# Patient Record
Sex: FEMALE | Race: WHITE | NOT HISPANIC OR LATINO | Employment: FULL TIME | URBAN - METROPOLITAN AREA
[De-identification: names, ages, dates, MRNs, and addresses within clinical notes are randomized per-mention and may not be internally consistent; named-entity substitution may affect disease eponyms.]

---

## 2022-07-08 ENCOUNTER — HOSPITAL ENCOUNTER (INPATIENT)
Facility: CLINIC | Age: 62
LOS: 1 days | Discharge: HOME | DRG: 153 | End: 2022-07-09
Attending: EMERGENCY MEDICINE | Admitting: HOSPITALIST
Payer: COMMERCIAL

## 2022-07-08 DIAGNOSIS — J36 TONSILLAR ABSCESS: ICD-10-CM

## 2022-07-08 PROBLEM — J39.0 RETROPHARYNGEAL ABSCESS: Status: ACTIVE | Noted: 2022-07-08

## 2022-07-08 PROCEDURE — 258N000003 HC RX IP 258 OP 636: Performed by: EMERGENCY MEDICINE

## 2022-07-08 PROCEDURE — 36415 COLL VENOUS BLD VENIPUNCTURE: CPT | Performed by: EMERGENCY MEDICINE

## 2022-07-08 PROCEDURE — 99285 EMERGENCY DEPT VISIT HI MDM: CPT | Mod: 25

## 2022-07-08 PROCEDURE — 250N000011 HC RX IP 250 OP 636: Performed by: EMERGENCY MEDICINE

## 2022-07-08 PROCEDURE — 96366 THER/PROPH/DIAG IV INF ADDON: CPT

## 2022-07-08 PROCEDURE — 120N000001 HC R&B MED SURG/OB

## 2022-07-08 PROCEDURE — 96365 THER/PROPH/DIAG IV INF INIT: CPT

## 2022-07-08 PROCEDURE — 87040 BLOOD CULTURE FOR BACTERIA: CPT | Performed by: EMERGENCY MEDICINE

## 2022-07-08 RX ORDER — SODIUM CHLORIDE 9 MG/ML
INJECTION, SOLUTION INTRAVENOUS CONTINUOUS
Status: DISCONTINUED | OUTPATIENT
Start: 2022-07-08 | End: 2022-07-09 | Stop reason: HOSPADM

## 2022-07-08 RX ORDER — AMPICILLIN AND SULBACTAM 2; 1 G/1; G/1
3 INJECTION, POWDER, FOR SOLUTION INTRAMUSCULAR; INTRAVENOUS ONCE
Status: COMPLETED | OUTPATIENT
Start: 2022-07-08 | End: 2022-07-09

## 2022-07-08 RX ADMIN — SODIUM CHLORIDE 1000 ML: 9 INJECTION, SOLUTION INTRAVENOUS at 22:03

## 2022-07-08 RX ADMIN — AMPICILLIN SODIUM AND SULBACTAM SODIUM 3 G: 2; 1 INJECTION, POWDER, FOR SOLUTION INTRAMUSCULAR; INTRAVENOUS at 22:27

## 2022-07-08 ASSESSMENT — ENCOUNTER SYMPTOMS
ABDOMINAL PAIN: 0
FEVER: 1
VOMITING: 0
SORE THROAT: 1

## 2022-07-08 ASSESSMENT — ACTIVITIES OF DAILY LIVING (ADL): ADLS_ACUITY_SCORE: 35

## 2022-07-09 VITALS
HEART RATE: 80 BPM | HEIGHT: 68 IN | DIASTOLIC BLOOD PRESSURE: 69 MMHG | SYSTOLIC BLOOD PRESSURE: 106 MMHG | BODY MASS INDEX: 22.43 KG/M2 | TEMPERATURE: 98.2 F | RESPIRATION RATE: 18 BRPM | WEIGHT: 148 LBS | OXYGEN SATURATION: 97 %

## 2022-07-09 LAB
ANION GAP SERPL CALCULATED.3IONS-SCNC: 5 MMOL/L (ref 3–14)
BASOPHILS # BLD AUTO: 0 10E3/UL (ref 0–0.2)
BASOPHILS NFR BLD AUTO: 0 %
BUN SERPL-MCNC: 19 MG/DL (ref 7–30)
CALCIUM SERPL-MCNC: 8.3 MG/DL (ref 8.5–10.1)
CHLORIDE BLD-SCNC: 109 MMOL/L (ref 94–109)
CO2 SERPL-SCNC: 26 MMOL/L (ref 20–32)
CREAT SERPL-MCNC: 0.45 MG/DL (ref 0.52–1.04)
EOSINOPHIL # BLD AUTO: 0 10E3/UL (ref 0–0.7)
EOSINOPHIL NFR BLD AUTO: 0 %
ERYTHROCYTE [DISTWIDTH] IN BLOOD BY AUTOMATED COUNT: 13.1 % (ref 10–15)
GFR SERPL CREATININE-BSD FRML MDRD: >90 ML/MIN/1.73M2
GLUCOSE BLD-MCNC: 142 MG/DL (ref 70–99)
HCT VFR BLD AUTO: 36 % (ref 35–47)
HGB BLD-MCNC: 11.4 G/DL (ref 11.7–15.7)
IMM GRANULOCYTES # BLD: 0 10E3/UL
IMM GRANULOCYTES NFR BLD: 0 %
LYMPHOCYTES # BLD AUTO: 1.2 10E3/UL (ref 0.8–5.3)
LYMPHOCYTES NFR BLD AUTO: 16 %
MCH RBC QN AUTO: 29.2 PG (ref 26.5–33)
MCHC RBC AUTO-ENTMCNC: 31.7 G/DL (ref 31.5–36.5)
MCV RBC AUTO: 92 FL (ref 78–100)
MONOCYTES # BLD AUTO: 0.2 10E3/UL (ref 0–1.3)
MONOCYTES NFR BLD AUTO: 3 %
NEUTROPHILS # BLD AUTO: 6.1 10E3/UL (ref 1.6–8.3)
NEUTROPHILS NFR BLD AUTO: 81 %
NRBC # BLD AUTO: 0 10E3/UL
NRBC BLD AUTO-RTO: 0 /100
PLATELET # BLD AUTO: 299 10E3/UL (ref 150–450)
POTASSIUM BLD-SCNC: 3.9 MMOL/L (ref 3.4–5.3)
RBC # BLD AUTO: 3.91 10E6/UL (ref 3.8–5.2)
SODIUM SERPL-SCNC: 140 MMOL/L (ref 133–144)
WBC # BLD AUTO: 7.6 10E3/UL (ref 4–11)

## 2022-07-09 PROCEDURE — 250N000011 HC RX IP 250 OP 636: Performed by: HOSPITALIST

## 2022-07-09 PROCEDURE — 99236 HOSP IP/OBS SAME DATE HI 85: CPT | Performed by: HOSPITALIST

## 2022-07-09 PROCEDURE — 99207 PR APP CREDIT; MD BILLING SHARED VISIT: CPT | Performed by: HOSPITALIST

## 2022-07-09 PROCEDURE — 85025 COMPLETE CBC W/AUTO DIFF WBC: CPT | Performed by: HOSPITALIST

## 2022-07-09 PROCEDURE — 258N000003 HC RX IP 258 OP 636: Performed by: HOSPITALIST

## 2022-07-09 PROCEDURE — 80048 BASIC METABOLIC PNL TOTAL CA: CPT | Performed by: HOSPITALIST

## 2022-07-09 PROCEDURE — 96361 HYDRATE IV INFUSION ADD-ON: CPT

## 2022-07-09 PROCEDURE — 96366 THER/PROPH/DIAG IV INF ADDON: CPT

## 2022-07-09 PROCEDURE — 36415 COLL VENOUS BLD VENIPUNCTURE: CPT | Performed by: HOSPITALIST

## 2022-07-09 PROCEDURE — 96375 TX/PRO/DX INJ NEW DRUG ADDON: CPT

## 2022-07-09 PROCEDURE — 250N000013 HC RX MED GY IP 250 OP 250 PS 637: Performed by: HOSPITALIST

## 2022-07-09 RX ORDER — LIDOCAINE 40 MG/G
CREAM TOPICAL
Status: DISCONTINUED | OUTPATIENT
Start: 2022-07-09 | End: 2022-07-09 | Stop reason: HOSPADM

## 2022-07-09 RX ORDER — FAMOTIDINE 20 MG/1
20 TABLET, FILM COATED ORAL 2 TIMES DAILY
Status: DISCONTINUED | OUTPATIENT
Start: 2022-07-09 | End: 2022-07-09 | Stop reason: HOSPADM

## 2022-07-09 RX ORDER — PREDNISONE 20 MG/1
TABLET ORAL
Qty: 10 TABLET | Refills: 0 | Status: SHIPPED | OUTPATIENT
Start: 2022-07-09

## 2022-07-09 RX ORDER — SODIUM CHLORIDE 9 MG/ML
INJECTION, SOLUTION INTRAVENOUS CONTINUOUS
Status: DISCONTINUED | OUTPATIENT
Start: 2022-07-09 | End: 2022-07-09 | Stop reason: HOSPADM

## 2022-07-09 RX ORDER — ACETAMINOPHEN 325 MG/1
975 TABLET ORAL EVERY 8 HOURS
Status: DISCONTINUED | OUTPATIENT
Start: 2022-07-09 | End: 2022-07-09 | Stop reason: HOSPADM

## 2022-07-09 RX ORDER — ONDANSETRON 4 MG/1
4 TABLET, ORALLY DISINTEGRATING ORAL EVERY 6 HOURS PRN
Status: DISCONTINUED | OUTPATIENT
Start: 2022-07-09 | End: 2022-07-09 | Stop reason: HOSPADM

## 2022-07-09 RX ORDER — ONDANSETRON 2 MG/ML
4 INJECTION INTRAMUSCULAR; INTRAVENOUS EVERY 6 HOURS PRN
Status: DISCONTINUED | OUTPATIENT
Start: 2022-07-09 | End: 2022-07-09 | Stop reason: HOSPADM

## 2022-07-09 RX ORDER — AMPICILLIN AND SULBACTAM 2; 1 G/1; G/1
3 INJECTION, POWDER, FOR SOLUTION INTRAMUSCULAR; INTRAVENOUS EVERY 6 HOURS
Status: DISCONTINUED | OUTPATIENT
Start: 2022-07-09 | End: 2022-07-09 | Stop reason: HOSPADM

## 2022-07-09 RX ORDER — DEXAMETHASONE SODIUM PHOSPHATE 10 MG/ML
10 INJECTION, SOLUTION INTRAMUSCULAR; INTRAVENOUS EVERY 8 HOURS
Status: DISCONTINUED | OUTPATIENT
Start: 2022-07-09 | End: 2022-07-09 | Stop reason: HOSPADM

## 2022-07-09 RX ADMIN — FAMOTIDINE 20 MG: 20 TABLET ORAL at 08:31

## 2022-07-09 RX ADMIN — ACETAMINOPHEN 975 MG: 325 TABLET, FILM COATED ORAL at 08:31

## 2022-07-09 RX ADMIN — DEXAMETHASONE SODIUM PHOSPHATE 10 MG: 10 INJECTION, SOLUTION INTRAMUSCULAR; INTRAVENOUS at 08:32

## 2022-07-09 RX ADMIN — AMPICILLIN SODIUM AND SULBACTAM SODIUM 3 G: 2; 1 INJECTION, POWDER, FOR SOLUTION INTRAMUSCULAR; INTRAVENOUS at 04:04

## 2022-07-09 RX ADMIN — AMPICILLIN SODIUM AND SULBACTAM SODIUM 3 G: 2; 1 INJECTION, POWDER, FOR SOLUTION INTRAMUSCULAR; INTRAVENOUS at 09:57

## 2022-07-09 RX ADMIN — SODIUM CHLORIDE: 9 INJECTION, SOLUTION INTRAVENOUS at 04:04

## 2022-07-09 ASSESSMENT — ACTIVITIES OF DAILY LIVING (ADL)
ADLS_ACUITY_SCORE: 35

## 2022-07-09 NOTE — ED NOTES
Dr. Reddy of ENT graciously came down to the Emergency Department to evaluate the patient.  He spoke with me at 10:15 AM.  He reports there is no indication for incision and drainage at this time and this appears more consistent with phlegmon.  He is recommending Augmentin twice daily for 10 days and prednisone 40 mg once daily for 5 days.  Patient can be discharged home per his evaluation.     Jair Resendez MD  07/09/22 1019

## 2022-07-09 NOTE — CONSULTS
ENT CONSULT    IMPRESSION: right palatine tonsil phlegmonous findings with tonsillitis.  No drainable abscess.  Much improved in last 12 hours with treatment with Unasyn and decadron.      RECC: OK to discharge home today with oral Augmentin 875 bid x 10 days, prednisone 40mg daily for 5 days.  Follow up with ENT as needed if worsening symptoms. If she continues to improve, scheduled follow up not necessary.    CC: sore throat    HPI: 60 yo female with severe sore throat which did not improve with oral amoxicillin. Right assymetrical pain, and right ear pain.  She had limited PO before presentation at ED yesterday.  CT of neck at [facility]ency room showed phlegmonous changes right tonsil.  She denies any difficulty breathing.    CT NECK 7/8/22 through [facility]  1.  Asymmetric enlargement of the right palatine tonsil with extension into the right hypopharynx, supraglottic region and the right prevertebral soft tissues. This could be on infectious or inflammatory basis. However recommend correlation with direct visualization.   2.  There are couple of small ill-defined hypodensities associated with the right palatine tonsil. 1.4 x 1.4 cm hyperdensity in the right palatine tonsil with another area of 1.2 x 1.2 cm hypodensity within the right palatine tonsil. These areas might communicate. These areas may represent areas of phlegmon or early abscess.   3.  Mild prominence predominantly right level 1 and 2 lymphadenopathy as detailed above, favor to be reactive. Follow-up to resolution.      No past medical history on file.    Current Facility-Administered Medications   Medication     acetaminophen (TYLENOL) tablet 975 mg     ampicillin-sulbactam (UNASYN) 3 g vial to attach to  mL bag     dexamethasone PF (DECADRON) injection 10 mg     famotidine (PEPCID) tablet 20 mg     lidocaine (LMX4) cream     lidocaine 1 % 0.1-1 mL     melatonin tablet 1 mg     ondansetron (ZOFRAN ODT) ODT tab 4 mg    Or     ondansetron (ZOFRAN)  "injection 4 mg     sodium chloride (PF) 0.9% PF flush 3 mL     sodium chloride (PF) 0.9% PF flush 3 mL     sodium chloride 0.9% infusion     sodium chloride 0.9% infusion     Current Outpatient Medications   Medication     amoxicillin-clavulanate (AUGMENTIN) 875-125 MG tablet     predniSONE (DELTASONE) 20 MG tablet      No Known Allergies  Social History     Socioeconomic History     Marital status:      Spouse name: Not on file     Number of children: Not on file     Years of education: Not on file     Highest education level: Not on file   Occupational History     Not on file   Tobacco Use     Smoking status: Not on file     Smokeless tobacco: Not on file   Substance and Sexual Activity     Alcohol use: Not on file     Drug use: Not on file     Sexual activity: Not on file   Other Topics Concern     Not on file   Social History Narrative     Not on file     Social Determinants of Health     Financial Resource Strain: Not on file   Food Insecurity: Not on file   Transportation Needs: Not on file   Physical Activity: Not on file   Stress: Not on file   Social Connections: Not on file   Intimate Partner Violence: Not on file   Housing Stability: Not on file     ROS as per HPI    EXAM  /62   Pulse 75   Temp 98.2  F (36.8  C) (Oral)   Resp 18   Ht 1.727 m (5' 8\")   Wt 67.1 kg (148 lb)   SpO2 95%   BMI 22.50 kg/m    Ears normal  Nose: no infection  OC/OP: no palatal bulge, no significant trismus, Right tonsil is larger than left tonsil, and right tonsil is red.   NECK: no LAD, soft    FLEXIBLE FIBEROPTIC LARYNGOSCOPY  Nasal cavities: patent and normal.    Nasopharynx examination: Normal   Hypopharnx: no bulging, no sign of abscess, right tonsil visibly enlarged  Base of tongue, Pharyngeal walls, Vallecula and Pyriform Sinuses: Normal  Larynx: Normal, no edema, vocal cords symmetrically mobile  Subglottis and trachea: Normal    LABS/CT report and images reviewed, in chart    Anthony Reddy M.D.    "

## 2022-07-09 NOTE — H&P
[facility]    History and Physical  Hospitalist     Date of Admission:  7/8/2022  Date of Service (when I saw the patient): 07/09/22  Provider: Trevor Gomez MD      Chief Complaint   Sore throat, difficulty to swallow and fever.    History is obtained from the patient, electronic health record and emergency department physician    History of Present Illness   Gisselle Mayer is a 61 year old female who is relatively healthy.  She presents after several days of sore throat, fever and difficulty swallowing.  She was seen by her primary care physician and started on amoxicillin 3 days ago, but because she continued worsening she went to urgent care today.  CT performed at at the site showed retropharyngeal abscess.  She received 1 dose of Unasyn IV and 1 dose of Decadron 10 mg IV as well as Tylenol. She was sent to the emergency department.  ENT on-call was contacted and requested admission to the hospital for further treatment and follow-up.  On presentation, the patient did not have difficulty breathing or stridor.  She was feeling better after the preliminary treatment in urgent care.      Past Medical History    I have reviewed this patient's medical history and updated it with pertinent information if needed.   No known chronic comorbidities, she is not on any medication      Assessment & Plan   Gisselle Mayer is a 61 year old female who presents with several days of sore throat, difficulty swallowing and fever.  In urgent care she has been found with hypopharyngeal abscess visualized in the CT of the neck soft tissues.  She was started on antibiotic, Decadron and sent to the emergency department for assessment.  She is admitted at the request of ENT who plans to see her in the morning.    1.  Hypopharyngeal abscess, likely streptococcal infection.  No evidence of impending airways collapse, no signs of difficulty breathing, no stridor.  -Admit to inpatient.  - Diet as tolerated.  -  Vitals per unit routine.  - Treatment of pain and fever with Tylenol as needed.  - Famotidine for gastric protection.  - Zofran for nausea and vomiting.  - Normal saline for hydration.  - Unasyn 3 g every 6 hours IV.  -PCD's.    Clinically Significant Risk Factors Present on Admission                          Code Status   Full Code    Primary Care Physician   [facility]      Past Surgical History   I have reviewed this patient's surgical history and updated it with pertinent information if needed.  No past surgical history on file.    Prior to Admission Medications   None     Allergies   No Known Allergies    Social History   I have personally reviewed the social history with the patient showing.  Social History     Tobacco Use     Smoking status:  Never     Smokeless tobacco:  Denies   Substance Use Topics     Alcohol use: Not a user       Family History   I have reviewed this patient's family history and it is not contributory to the admission .       Review of Systems   Except as noted in the HPI, a 12-system Review of Systems was found to be negative.      Physical Exam   Vital Signs with Ranges  Temp:  [98.7  F (37.1  C)] 98.7  F (37.1  C)  Pulse:  [92] 92  Resp:  [16] 16  BP: (117-134)/(58-76) 117/58  SpO2:  [94 %-96 %] 94 %  148 lbs 0 oz    GEN:  Alert, oriented x 3, appears comfortable, NAD.  HEENT:  Normocephalic/atraumatic, no scleral icterus, no nasal discharge, mouth moist.  CV:  Regular rate and rhythm, no murmur or JVD.  S1 + S2 noted, no S3 or S4.  LUNGS:  Clear to auscultation bilaterally without rales/rhonchi/wheezing/retractions.  Symmetric chest rise on inhalation noted.  ABD:  Active bowel sounds, soft, non-tender/non-distended.  No rebound/guarding/rigidity.  EXT:  No edema or cyanosis.  No joint synovitis noted.  SKIN:  Dry to touch, no exanthems noted in the visualized areas.       Data   I personally reviewed the EKG tracing showing NSR.  No results found for this or any previous  visit (from the past 24 hour(s)).    Securely message with the Vocera Web Console (learn more here)  Text page via [employer] Paging/Directory        Disclaimer: This note consists of symbols derived from keyboarding, dictation and/or voice recognition software. As a result, there may be errors in the script that have gone undetected. Please consider this when interpreting information found in this chart.

## 2022-07-09 NOTE — ED TRIAGE NOTES
Pt had sore throat x1 week. Went to [facility] clinic - covid and strep tests negative. Progressively worse. Unable to eat solids since Tuesday. Swelling to right side of throat up to right ear. Went to PCP on Wednesday and thought to be peridontal abscess - taking amoxicillin since Wednesday. Febrile since Tuesday. Urgency room tonight - did CT scan and found 2 abscesses on right tonsil. Tylenol last at 0900. Given steroids, abx and pain meds at UR. Denies SOB or difficulty breathing. Covid test negative at UR.

## 2022-07-09 NOTE — ED NOTES
Pt resting in bed with eyes closed throughout shift. Able to make needs known. Ambulates independently to bathroom. A&Ox4.

## 2022-07-09 NOTE — ED PROVIDER NOTES
"  History   Chief Complaint:  Peritonsillar Abscess       The history is provided by the patient.      Gisselle Mayer is a 61 year old female who presents with neck pain. The patient reports 1 week ago onset of a sore throat and the next morning she went to [facility] where she tested negative for Strep and COVID-19.  On Wednesday her pain was worse and she had difficulty swallowing.  She went to her primary care physician and has been taking amoxicillin.  Today is the third day.  However, today the pain worsened and she had pain with drinking liquids so she went to the [facility]rgency Room.  She did test positive for COVID-19 during March but has fully recovered. She denies smoking, drinking alcohol. Denies abdominal pain or vomiting. Denies history of diabetes.    Patient went to the [facility] Room earlier tonight where she had a CT scan demonstrating 2 small areas of phlegmonous or early abscess change within the right palatine tonsil with inflammation and likely infection tracking into the hypopharynx.  She is not drooling or stridorous.  She feels that her pain improved with a dose of Decadron and some IV fluids.    Review of Systems   Constitutional: Positive for fever.   HENT: Positive for sore throat.    Gastrointestinal: Negative for abdominal pain and vomiting.   All other systems reviewed and are negative.    Allergies:  No Known Allergies    Medications:  Amoxicillin     Past Medical History:     There is no problem list on file for this patient.     Social History:  Patient presents with spouse.   Patient arrives via private vehicle.     Physical Exam     Patient Vitals for the past 24 hrs:   BP Temp Temp src Pulse Resp SpO2 Height Weight   07/08/22 2035 134/76 98.7  F (37.1  C) Oral 92 16 96 % 1.727 m (5' 8\") 67.1 kg (148 lb)       Physical Exam  Constitutional:       General: She is not in acute distress.     Appearance: She is not diaphoretic.   HENT:      Head: Atraumatic.      Right Ear: Tympanic " membrane, ear canal and external ear normal.      Left Ear: Tympanic membrane, ear canal and external ear normal.      Nose: Nose normal.      Mouth/Throat:      Comments: There is erythema of the posterior oropharynx.  Uvula is midline.  There is fullness and increased erythema with purulent exudate of the right tonsil and right peritonsillar area.  The airway is patent though.  No drooling or stridor.  No sublingual induration or elevation of the floor the mouth.  No submandibular induration.  There is tenderness along the angle of the mandible on the right and the right anterior cervical chain.  Eyes:      General: No scleral icterus.     Pupils: Pupils are equal, round, and reactive to light.   Cardiovascular:      Rate and Rhythm: Normal rate and regular rhythm.      Heart sounds: Normal heart sounds.   Pulmonary:      Effort: No respiratory distress.      Breath sounds: Normal breath sounds.   Abdominal:      General: Bowel sounds are normal.      Palpations: Abdomen is soft.      Tenderness: There is no abdominal tenderness.   Musculoskeletal:         General: No tenderness.   Skin:     General: Skin is warm.      Capillary Refill: Capillary refill takes less than 2 seconds.      Findings: No rash.   Neurological:      General: No focal deficit present.      Mental Status: She is oriented to person, place, and time.   Psychiatric:         Mood and Affect: Mood normal.         Behavior: Behavior normal.       Emergency Department Course     Emergency Department Course:     Reviewed:  I reviewed nursing notes, vitals, past medical history and Care Everywhere.    Assessments:  2144 I obtained history and examined the patient as noted above.     Consults:  2159 I spoke with Dr. Reddy, ENT.   2213 I spoke with Dr. Gomez, hospitalist, who accepts admission.     Interventions:  2203 NS, 1000 mL, IV    Disposition:  The patient was admitted to the hospital under the care of Dr. Gomez.     Impression & Plan      Medical Decision Making:     This patient is a pleasant 61-year-old woman who presents to the ED with an abscess of the right posterior oropharynx and tracking into the supraglottic area.  She is protecting her airway and is not drooling or stridorous.  She actually feels improved after having Decadron and IV fluids earlier.  I spoke with ENT who will be consulting.  There is no indication for emergent surgical intervention.  Blood cultures were obtained and the patient was initiated on Unasyn.  She will have Decadron continued.  She tested negative for COVID-19 earlier tonight.      Diagnosis:    ICD-10-CM    1. Retropharyngeal abscess  J39.0        Scribe Disclosure:  I, Miriam Kim, am serving as a scribe at 9:51 PM on 7/8/2022 to document services personally performed by Linden Worley, based on my observations and the provider's statements to me.            Linden Worley MD  07/08/22 6709

## 2022-07-09 NOTE — ED NOTES
"[facility]  ED Nurse Handoff Report    Gisselle Mayer is a 61 year old female   ED Chief complaint: Peritonsillar Abscess  . ED Diagnosis:   Final diagnoses:   Retropharyngeal abscess     Allergies: No Known Allergies    Code Status: Full Code  Activity level - Baseline/Home:  Independent. Activity Level - Current:   Independent. Lift room needed: No. Bariatric: No   Needed: No   Isolation: No. Infection: Not Applicable.     Vital Signs:   Vitals:    07/08/22 2035   BP: 134/76   Pulse: 92   Resp: 16   Temp: 98.7  F (37.1  C)   TempSrc: Oral   SpO2: 96%   Weight: 67.1 kg (148 lb)   Height: 1.727 m (5' 8\")       Cardiac Rhythm:  ,      Pain level:    Patient confused: No. Patient Falls Risk: No.   Elimination Status: Has voided   Patient Report - Initial Complaint: referral. Focused Assessment: see above   Tests Performed: see CT from urgency room. Abnormal Results: see CT.   Treatments provided: iv abx  Family Comments: At bedside  OBS brochure/video discussed/provided to patient:  No  ED Medications:   Medications   0.9% sodium chloride BOLUS (1,000 mLs Intravenous New Bag 7/8/22 2203)     Followed by   sodium chloride 0.9% infusion (has no administration in time range)   ampicillin-sulbactam (UNASYN) 3 g vial to attach to  mL bag (3 g Intravenous New Bag 7/8/22 2227)     Drips infusing:  Yes  For the majority of the shift, the patient's behavior Green. Interventions performed were NA.    Sepsis treatment initiated: No     Patient tested for COVID 19 prior to admission: NO, was tested at the urgency room today    ED Nurse Name/Phone Number: Candice Lopez RN,   10:28 PM      "

## 2022-07-09 NOTE — DISCHARGE SUMMARY
[facility]  Hospitalist Discharge Summary      Date of Admission:  7/8/2022  Date of Discharge:  7/9/2022  Discharging Provider: Luke Merrill DO  Discharge Service: Hospitalist Service    Discharge Diagnoses   -hypopharyngeal abscess    Follow-ups Needed After Discharge   Follow-up Appointments     Follow-up and recommended labs and tests       Follow up with ENT team in a week        Discharge Disposition   Discharged to home  Condition at discharge: Stable    Hospital Course   Gisselle Mayer is a 61 year old female who presents with several days of sore throat, difficulty swallowing and fever.  In urgent care she has been found with hypopharyngeal abscess visualized in the CT of the neck soft tissues.  She was started on antibiotic, Decadron and sent to the emergency department for assessment.  She is admitted at the request of ENT who plans to see her in the morning.    Hypopharyngeal abscess  -likely streptococcal infection.  No evidence of impending airways collapse, no signs of difficulty breathing, no stridor.  -admitted and seen by ENT, changed to oral augmentin for 10 days and Prednisone to complete 5 day course and will follow up closely   -if symptoms worsen discussed seeking further medical attention    Consultations This Hospital Stay   CARE MANAGEMENT / SOCIAL WORK IP CONSULT  ENT IP CONSULT    Code Status   Full Code    Time Spent on this Encounter   I, Luke Merrill DO, personally saw the patient today and spent greater than 30 minutes discharging this patient.       DO [facility] EMERGENCY DEPT  [address] [place] [address]  Phone: [phone]  Fax: [phone]  ______________________________________________________________________    Physical Exam   Vital Signs: Temp: 98.2  F (36.8  C) Temp src: Oral BP: 118/62 Pulse: 75   Resp: 18 SpO2: 95 % O2 Device: None (Room air)    Weight: 148 lbs 0 oz  Face to face completed  day of discharge       Primary Care Physician   [facility]    Discharge Orders      Reason for your hospital stay    Admitted for retropharyngeal abscess, seen by ENT and medications given. Will discharge home on augmentin and steroids with close ENT follow-up as outpt.     Follow-up and recommended labs and tests     Follow up with ENT team in a week     Activity    Your activity upon discharge: activity as tolerated     Diet    Follow this diet upon discharge: clear liquids, advance as tolerated to soft diet       Significant Results and Procedures   Most Recent 3 CBC's:Recent Labs   Lab Test 07/09/22  0915   WBC 7.6   HGB 11.4*   MCV 92        Most Recent 3 BMP's:Recent Labs   Lab Test 07/09/22  0915      POTASSIUM 3.9   CHLORIDE 109   CO2 26   BUN 19   CR 0.45*   ANIONGAP 5   REA 8.3*   *     Most Recent 2 LFT's:No lab results found., No results found for this or any previous visit.    Discharge Medications   Current Discharge Medication List      START taking these medications    Details   amoxicillin-clavulanate (AUGMENTIN) 875-125 MG tablet Take 1 tablet by mouth 2 times daily  Qty: 20 tablet, Refills: 0      predniSONE (DELTASONE) 20 MG tablet Take two tablets (= 40mg) each day for 5 (five) days  Qty: 10 tablet, Refills: 0           Allergies   No Known Allergies

## 2022-07-14 LAB
BACTERIA BLD CULT: NO GROWTH
BACTERIA BLD CULT: NO GROWTH